# Patient Record
Sex: FEMALE | Race: WHITE
[De-identification: names, ages, dates, MRNs, and addresses within clinical notes are randomized per-mention and may not be internally consistent; named-entity substitution may affect disease eponyms.]

---

## 2019-08-15 ENCOUNTER — HOSPITAL ENCOUNTER (EMERGENCY)
Dept: HOSPITAL 53 - M ED | Age: 69
Discharge: HOME | End: 2019-08-15
Payer: MEDICARE

## 2019-08-15 VITALS — DIASTOLIC BLOOD PRESSURE: 75 MMHG | SYSTOLIC BLOOD PRESSURE: 155 MMHG

## 2019-08-15 VITALS — HEIGHT: 68 IN | BODY MASS INDEX: 30.38 KG/M2 | WEIGHT: 200.42 LBS

## 2019-08-15 DIAGNOSIS — M77.32: ICD-10-CM

## 2019-08-15 DIAGNOSIS — S63.502A: ICD-10-CM
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
## 2019-08-15 DIAGNOSIS — S36.220A: ICD-10-CM
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
## 2019-08-15 DIAGNOSIS — S93.401A: ICD-10-CM

## 2019-08-15 DIAGNOSIS — S92.355A: ICD-10-CM

## 2019-08-15 DIAGNOSIS — S00.81XA: Primary | ICD-10-CM

## 2019-08-15 DIAGNOSIS — E78.5: ICD-10-CM

## 2019-08-15 DIAGNOSIS — S93.402A: ICD-10-CM

## 2019-08-15 DIAGNOSIS — S82.401A: ICD-10-CM

## 2019-08-15 PROCEDURE — 73110 X-RAY EXAM OF WRIST: CPT

## 2019-08-15 PROCEDURE — 80047 BASIC METABLC PNL IONIZED CA: CPT

## 2019-08-15 PROCEDURE — 73200 CT UPPER EXTREMITY W/O DYE: CPT

## 2019-08-15 PROCEDURE — 73610 X-RAY EXAM OF ANKLE: CPT

## 2019-08-15 PROCEDURE — 29515 APPLICATION SHORT LEG SPLINT: CPT

## 2019-08-15 PROCEDURE — 70486 CT MAXILLOFACIAL W/O DYE: CPT

## 2019-08-15 PROCEDURE — 96374 THER/PROPH/DIAG INJ IV PUSH: CPT

## 2019-08-15 PROCEDURE — 73630 X-RAY EXAM OF FOOT: CPT

## 2019-08-15 PROCEDURE — 70150 X-RAY EXAM OF FACIAL BONES: CPT

## 2019-08-15 PROCEDURE — 99283 EMERGENCY DEPT VISIT LOW MDM: CPT

## 2019-08-15 NOTE — REP
Four views of the left ankle were obtained and show previously described fracture

of the base of the 5th metatarsal. Plantar and retrocalcaneal heel spurs are

present. There are no additional fractures.

 

IMPRESSION:Findings as described above. See foot report.

 

Four views of the right ankle were obtained were obtained and show a distal

fibular fracture with an intact mortise. Plantar and retrocalcaneal heel spurs

are present. No additional acute fractures are noted.

 

IMPRESSION:

 

Distal fibular fracture.

 

 

Electronically Signed by

Ronald Win DO 08/15/2019 07:39 P

## 2019-08-15 NOTE — REP
HISTORY: Pain after trauma.

 

COMPARISON: None.

 

There is a very subtle cortical irregularity seen in the region of the triquetral

on the lateral view only. This is seen in conjunction with evidence of soft

tissue swelling. The examination is otherwise within normal limits.

 

IMPRESSION:

 

Possible triquetral fracture.

 

 

Electronically Signed by

Ronald Win DO 08/15/2019 07:41 P

## 2019-08-15 NOTE — REPVR
EXAM: 

CT Maxillofacial Without Contrast 



EXAM DATE/TIME: 

8/15/2019 8:05 PM 



CLINICAL HISTORY: 

69 years old, female; Injury or trauma; Fall; Initial encounter; Blunt trauma 

(contusions or hematomas); Orbit/periorbital; Left; Additional info: Fall, 

hematoma above left eye 



TECHNIQUE: 

Imaging protocol: Computed tomography images of the face without contrast. 

Coronal and sagittal reformatted images were created and reviewed. 

Radiation optimization: All CT scans at this facility use at least one of these 

dose optimization techniques: automated exposure control; mA and/or kV 

adjustment per patient size (includes targeted exams where dose is matched to 

clinical indication); or iterative reconstruction. 



COMPARISON: 

CR Facial Bones 8/15/2019 5:52 PM 



FINDINGS: 

Orbits: Orbits are normal. Globes are unremarkable. 

Sinuses: Normal. No air-fluid levels. 

Bones/joints: There is no fracture of the orbits or zygomatic arches. There is 

no fracture of the nasal bones. The nasal septum is deviated to the right. 

There is no fracture of the sinuses. There is no fracture of the maxilla or 

mandible. 

Soft tissues: There is left frontal soft tissue swelling. 



IMPRESSION: 

No fracture. 



Electronically signed by: Hong Whitmore On 08/15/2019  21:11:53 PM

## 2019-08-15 NOTE — REPVR
EXAM: 

CT Left Upper Extremity Without Contrast, Wrist 



EXAM DATE/TIME: 

8/15/2019 8:05 PM 



CLINICAL HISTORY: 

69 years old, female; Injury or trauma; Fall; Initial encounter; Blunt trauma 

(contusions or hematomas; Wrist; Left; Additional info: Fall, hematoma above 

left eye 



TECHNIQUE: 

Imaging protocol: CT of the Left upper extremity without contrast was 

performed. Exam focused on the wrist. Coronal and sagittal reformatted images 

were created and reviewed. 

Radiation optimization: All CT scans at this facility use at least one of these 

dose optimization techniques: automated exposure control; mA and/or kV 

adjustment per patient size (includes targeted exams where dose is matched to 

clinical indication); or iterative reconstruction. 



COMPARISON: 

CR Wrist, complete LEFT 8/15/2019 5:52 PM 



FINDINGS: 

Bones/joints: There is no fracture of the distal radius or ulna. There is no 

fracture the carpal bones. Specifically, the triquetrum is intact. The bases of 

the metacarpals are intact. The there is no dislocation. 

Soft tissues: Normal. 



IMPRESSION: 

No fracture. 



Electronically signed by: Hong Whitmore On 08/15/2019  21:15:32 PM

## 2019-08-15 NOTE — REP
REASON: Facial pain after trauma.

 

Plain film examination of the facial bones is limited compared to CT. Although

this plain film examination shows no evidence of a gross fracture. A subtle

fracture could be obscured and for this CT is recommended.

 

 

Electronically Signed by

Ronald Win DO 08/15/2019 07:39 P

## 2019-08-15 NOTE — REP
REASON: Pain after a fall.

 

Degenerative changes are seen throughout the foot.

 

There is a tranverse fracture involving the base of the 5th metatarsal. There is

a plantar calcaneal heel spur.

 

IMPRESSION 5th metatarsal fracture.

 

 

Electronically Signed by

Ronald Win DO 08/15/2019 07:38 P